# Patient Record
Sex: FEMALE | Race: WHITE
[De-identification: names, ages, dates, MRNs, and addresses within clinical notes are randomized per-mention and may not be internally consistent; named-entity substitution may affect disease eponyms.]

---

## 2018-09-21 ENCOUNTER — HOSPITAL ENCOUNTER (EMERGENCY)
Dept: HOSPITAL 76 - ED | Age: 78
Discharge: HOME | End: 2018-09-21
Payer: MEDICARE

## 2018-09-21 VITALS — SYSTOLIC BLOOD PRESSURE: 160 MMHG | DIASTOLIC BLOOD PRESSURE: 72 MMHG

## 2018-09-21 DIAGNOSIS — I10: ICD-10-CM

## 2018-09-21 DIAGNOSIS — R07.89: Primary | ICD-10-CM

## 2018-09-21 LAB
ALBUMIN DIAFP-MCNC: 4.5 G/DL (ref 3.2–5.5)
ALBUMIN/GLOB SERPL: 2 {RATIO} (ref 1–2.2)
ALP SERPL-CCNC: 36 IU/L (ref 42–121)
ALT SERPL W P-5'-P-CCNC: 16 IU/L (ref 10–60)
ANION GAP SERPL CALCULATED.4IONS-SCNC: 7 MMOL/L (ref 6–13)
AST SERPL W P-5'-P-CCNC: 23 IU/L (ref 10–42)
BASOPHILS NFR BLD AUTO: 0 10^3/UL (ref 0–0.1)
BASOPHILS NFR BLD AUTO: 0.5 %
BILIRUB BLD-MCNC: 0.8 MG/DL (ref 0.2–1)
BUN SERPL-MCNC: 17 MG/DL (ref 6–20)
CALCIUM UR-MCNC: 9.3 MG/DL (ref 8.5–10.3)
CHLORIDE SERPL-SCNC: 104 MMOL/L (ref 101–111)
CLARITY UR REFRACT.AUTO: CLEAR
CO2 SERPL-SCNC: 30 MMOL/L (ref 21–32)
CREAT SERPLBLD-SCNC: 0.4 MG/DL (ref 0.4–1)
EOSINOPHIL # BLD AUTO: 0.1 10^3/UL (ref 0–0.7)
EOSINOPHIL NFR BLD AUTO: 0.9 %
ERYTHROCYTE [DISTWIDTH] IN BLOOD BY AUTOMATED COUNT: 13.2 % (ref 12–15)
GFRSERPLBLD MDRD-ARVRAT: 154 ML/MIN/{1.73_M2} (ref 89–?)
GLOBULIN SER-MCNC: 2.3 G/DL (ref 2.1–4.2)
GLUCOSE SERPL-MCNC: 108 MG/DL (ref 70–100)
GLUCOSE UR QL STRIP.AUTO: NEGATIVE MG/DL
HGB UR QL STRIP: 14.1 G/DL (ref 12–16)
KETONES UR QL STRIP.AUTO: NEGATIVE MG/DL
LIPASE SERPL-CCNC: 37 U/L (ref 22–51)
LYMPHOCYTES # SPEC AUTO: 1.2 10^3/UL (ref 1.5–3.5)
LYMPHOCYTES NFR BLD AUTO: 16.8 %
MCH RBC QN AUTO: 33.8 PG (ref 27–31)
MCHC RBC AUTO-ENTMCNC: 34 G/DL (ref 32–36)
MCV RBC AUTO: 99.2 FL (ref 81–99)
MONOCYTES # BLD AUTO: 0.5 10^3/UL (ref 0–1)
MONOCYTES NFR BLD AUTO: 7 %
NEUTROPHILS # BLD AUTO: 5.1 10^3/UL (ref 1.5–6.6)
NEUTROPHILS # SNV AUTO: 6.9 X10^3/UL (ref 4.8–10.8)
NEUTROPHILS NFR BLD AUTO: 74.8 %
NITRITE UR QL STRIP.AUTO: NEGATIVE
PDW BLD AUTO: 7.6 FL (ref 7.9–10.8)
PH UR STRIP.AUTO: 6 PH (ref 5–7.5)
PLATELET # BLD: 261 10^3/UL (ref 130–450)
PROT SPEC-MCNC: 6.8 G/DL (ref 6.7–8.2)
PROT UR STRIP.AUTO-MCNC: NEGATIVE MG/DL
RBC # UR STRIP.AUTO: NEGATIVE /UL
RBC MAR: 4.19 10^6/UL (ref 4.2–5.4)
SODIUM SERPLBLD-SCNC: 141 MMOL/L (ref 135–145)
SP GR UR STRIP.AUTO: 1.02 (ref 1–1.03)
UROBILINOGEN UR QL STRIP.AUTO: (no result) E.U./DL
UROBILINOGEN UR STRIP.AUTO-MCNC: NEGATIVE MG/DL

## 2018-09-21 PROCEDURE — 71046 X-RAY EXAM CHEST 2 VIEWS: CPT

## 2018-09-21 PROCEDURE — 85025 COMPLETE CBC W/AUTO DIFF WBC: CPT

## 2018-09-21 PROCEDURE — 81003 URINALYSIS AUTO W/O SCOPE: CPT

## 2018-09-21 PROCEDURE — 84484 ASSAY OF TROPONIN QUANT: CPT

## 2018-09-21 PROCEDURE — 80053 COMPREHEN METABOLIC PANEL: CPT

## 2018-09-21 PROCEDURE — 83690 ASSAY OF LIPASE: CPT

## 2018-09-21 PROCEDURE — 36415 COLL VENOUS BLD VENIPUNCTURE: CPT

## 2018-09-21 PROCEDURE — 81001 URINALYSIS AUTO W/SCOPE: CPT

## 2018-09-21 PROCEDURE — 99283 EMERGENCY DEPT VISIT LOW MDM: CPT

## 2018-09-21 PROCEDURE — 93005 ELECTROCARDIOGRAM TRACING: CPT

## 2018-09-21 PROCEDURE — 85379 FIBRIN DEGRADATION QUANT: CPT

## 2018-09-21 PROCEDURE — 87086 URINE CULTURE/COLONY COUNT: CPT

## 2018-09-21 NOTE — XRAY REPORT
Reason:  R low chest pain

Procedure Date:  09/21/2018   

Accession Number:  [id number] / [id number]                    

Procedure:  XR  - Chest 2 View X-Ray CPT Code:  87371

 

FULL RESULT:

 

 

EXAM:

CHEST RADIOGRAPHY

 

EXAM DATE: 9/21/2018 12:40 PM.

 

CLINICAL HISTORY: R low chest pain.

 

COMPARISON: None.

 

TECHNIQUE: 2 views.

 

FINDINGS:

Lungs/Pleura: No definite localized infiltrate, consolidation, effusion, 

or pneumothorax.

 

Mediastinum: Heart and mediastinal contours are unremarkable. Upper lobe 

vessels not distended.

 

Other: Mild scoliosis with degenerative changes.

IMPRESSION: No acute disease.

 

RADIA

## 2018-09-21 NOTE — ED PHYSICIAN DOCUMENTATION
History of Present Illness





<Viviana Choe W - Last Filed: 09/21/18 09:50>





- History obtained from


History obtained from: Patient





- History of Present Illness


Timing: Today (She woke up in the middle night with a pain, it is right lateral 

low chest wall going through to the back.  It is worse if she twists.  She is 

not short of breath with it and it does not hurt to take a deep breath per se.  

It also feels better in a sitting position than it does laying flat.  There is 

no associated nausea.  She has not eaten much today but she did have a peanut 

butter sandwich and that did not make it worse.  She has traveled recently, to 

[facility].  She denies pedal edema or calf pain.)





<Vasile Moss M - Last Filed: 09/21/18 13:40>





- Stated complaint


Stated Complaint: RT UPPER SIDE/BACK PX





- Chief complaint


Chief Complaint: General





Review of Systems


Constitutional: denies: Fever, Chills, Fatigue


Cardiac: denies: Palpitations


Respiratory: denies: Dyspnea, Cough


GI: denies: Nausea, Vomiting





<Vasile Moss M - Last Filed: 09/21/18 13:40>





PD PAST MEDICAL HISTORY





- Past Medical History


Cardiovascular: Hypertension





- Past Surgical History


Past Surgical History: Yes


Ortho: Arthroscopic surgery


/GYN: Hysterectomy





- Social History


Does the pt smoke?: No


Smoking Status: Never smoker


Does the pt drink ETOH?: Yes


Does the pt have substance abuse?: No





- Immunizations


Immunizations are current?: Yes





<Viviana Choe W - Last Filed: 09/21/18 09:50>





<Vasile Moss M - Last Filed: 09/21/18 13:40>





- Present Medications


Home Medications: 


                                Ambulatory Orders











 Medication  Instructions  Recorded  Confirmed


 


RX: Estradiol 0.5 mg PO DAILY 02/07/16 02/07/16


 


RX: Lisinopril 10 mg PO DAILY 02/07/16 02/07/16


 


RX: amLODIPine [Norvasc] 5 mg PO DAILY 02/07/16 02/07/16


 


Cyclobenzaprine [Flexeril] 10 mg PO TID PRN #7 tablet 09/21/18 














- Allergies


Allergies/Adverse Reactions: 


                                    Allergies











Allergy/AdvReac Type Severity Reaction Status Date / Time


 


No Known Drug Allergies Allergy   Verified 02/07/16 09:44














PD ED PE NORMAL





- Vitals


Vital signs reviewed: Yes





- General


General: Alert and oriented X 3, No acute distress





- HEENT


HEENT: Pharynx benign





- Neck


Neck: Supple, no meningeal sign, No bony TTP





- Cardiac


Cardiac: RRR, No murmur





- Respiratory


Respiratory: No respiratory distress, Clear bilaterally





- Abdomen


Abdomen: Normal bowel sounds, Soft, Non tender





- Derm


Derm: No rash (Specifically looking for shingles rash, none is present at this 

juncture)





- Extremities


Extremities: No edema, No calf tenderness / cord





- Neuro


Neuro: Alert and oriented X 3, Normal speech





<IsaVasile ENOC - Last Filed: 09/21/18 13:40>





Results





- EKG (time done)


  ** 0932


Rate: Rate (enter#) (66)


Rhythm: NSR


QRS: Low voltage


Ischemia: Normal ST segments





<RodgeronViviana W - Last Filed: 09/21/18 09:50>





- Vitals


Vitals: 


                               Vital Signs - 24 hr











  09/21/18 09/21/18





  09:22 13:15


 


Temperature 36.5 C 


 


Heart Rate 69 70


 


Respiratory 14 14





Rate  


 


Blood Pressure 175/72 H 160/72 H


 


O2 Saturation 100 100








                                     Oxygen











O2 Source                      Room air

















- Labs


Labs: 


                                Laboratory Tests











  09/21/18 09/21/18 09/21/18





  11:37 11:37 11:37


 


WBC  6.9  


 


RBC  4.19 L  


 


Hgb  14.1  


 


Hct  41.5  


 


MCV  99.2 H  


 


MCH  33.8 H  


 


MCHC  34.0  


 


RDW  13.2  


 


Plt Count  261  


 


MPV  7.6 L  


 


Neut # (Auto)  5.1  


 


Lymph # (Auto)  1.2 L  


 


Mono # (Auto)  0.5  


 


Eos # (Auto)  0.1  


 


Baso # (Auto)  0.0  


 


Absolute Nucleated RBC  0.00  


 


Nucleated RBC %  0.0  


 


D-Dimer    < 200.0 L


 


Sodium   141 


 


Potassium   3.5 


 


Chloride   104 


 


Carbon Dioxide   30 


 


Anion Gap   7.0 


 


BUN   17 


 


Creatinine   0.4 


 


Estimated GFR (MDRD)   154 


 


Glucose   108 H 


 


Calcium   9.3 


 


Total Bilirubin   0.8 


 


AST   23 


 


ALT   16 


 


Alkaline Phosphatase   36 L 


 


Troponin I   


 


Total Protein   6.8 


 


Albumin   4.5 


 


Globulin   2.3 


 


Albumin/Globulin Ratio   2.0 


 


Lipase   37 


 


Urine Color   


 


Urine Clarity   


 


Urine pH   


 


Ur Specific Gravity   


 


Urine Protein   


 


Urine Glucose (UA)   


 


Urine Ketones   


 


Urine Occult Blood   


 


Urine Nitrite   


 


Urine Bilirubin   


 


Urine Urobilinogen   


 


Ur Leukocyte Esterase   


 


Ur Microscopic Review   


 


Urine Culture Comments   














  09/21/18 09/21/18





  11:37 11:48


 


WBC  


 


RBC  


 


Hgb  


 


Hct  


 


MCV  


 


MCH  


 


MCHC  


 


RDW  


 


Plt Count  


 


MPV  


 


Neut # (Auto)  


 


Lymph # (Auto)  


 


Mono # (Auto)  


 


Eos # (Auto)  


 


Baso # (Auto)  


 


Absolute Nucleated RBC  


 


Nucleated RBC %  


 


D-Dimer  


 


Sodium  


 


Potassium  


 


Chloride  


 


Carbon Dioxide  


 


Anion Gap  


 


BUN  


 


Creatinine  


 


Estimated GFR (MDRD)  


 


Glucose  


 


Calcium  


 


Total Bilirubin  


 


AST  


 


ALT  


 


Alkaline Phosphatase  


 


Troponin I  < 0.04 


 


Total Protein  


 


Albumin  


 


Globulin  


 


Albumin/Globulin Ratio  


 


Lipase  


 


Urine Color   YELLOW


 


Urine Clarity   CLEAR


 


Urine pH   6.0


 


Ur Specific Gravity   1.025


 


Urine Protein   NEGATIVE


 


Urine Glucose (UA)   NEGATIVE


 


Urine Ketones   NEGATIVE


 


Urine Occult Blood   NEGATIVE


 


Urine Nitrite   NEGATIVE


 


Urine Bilirubin   NEGATIVE


 


Urine Urobilinogen   0.2 (NORMAL)


 


Ur Leukocyte Esterase   NEGATIVE


 


Ur Microscopic Review   NOT INDICATED


 


Urine Culture Comments   NOT INDICATED














PD MEDICAL DECISION MAKING





<Viviana Choe W - Last Filed: 09/21/18 09:50>





<Vasile Moss - Last Filed: 09/21/18 13:40>





- ED course


ED course: 





The history and physical suggests a muscular origin of her pain.  Other 

etiologies are considered given her age, vascular abnormality unlikely given 

normal chest x-ray, negative d-dimer, and patient very comfortable with no need 

for painkillers in the department.  She is not tender over her gallbladder and 

pain did not get worse with eating.  Shingles is considered but no rash now but 

reminded to watch for this. PE is considered given recent travel but her d-dimer

 and a vital signs are without evidence of this. (Vasile Moss)





- Sepsis Event


Vital Signs: 


                               Vital Signs - 24 hr











  09/21/18 09/21/18





  09:22 13:15


 


Temperature 36.5 C 


 


Heart Rate 69 70


 


Respiratory 14 14





Rate  


 


Blood Pressure 175/72 H 160/72 H


 


O2 Saturation 100 100








                                     Oxygen











O2 Source                      Room air

















Departure





<Viviana Cheo - Last Filed: 09/21/18 09:50>





- Departure


Record reviewed to determine appropriate education?: Yes





<Vasile Moss - Last Filed: 09/21/18 13:40>





- Departure


Disposition: 01 Home, Self Care


Clinical Impression: 


 Right-sided chest pain





Condition: Good


Instructions:  ED Chest Pain NonCardiac


Prescriptions: 


Cyclobenzaprine [Flexeril] 10 mg PO TID PRN #7 tablet


 PRN Reason: Spasms


Comments: 


As discussed, this seems like muscular pain, but there are a few things you need

 to watch out for.  One is the rash that would be associated with shingles.  

Please return if you develop a rash anywhere in that area.  Second would be a 

gallbladder issue, it really does not seem like that but if you notice increased

 pain after eating, you will need reevaluation for that.





Call your doctor to arrange a follow-up appointment, make the next available 

appointment.  In the interim, return anytime if worse or if new symptoms 

develop.





Your blood pressure was elevated today on check into the emergency department.  

This does not mean that you have hypertension, it is a common phenomenon to come

 to the emergency department and have elevated blood pressure.  I recommend that

 you see your primary care physician within the week to have it rechecked when 

you are feeling better.


Discharge Date/Time: 09/21/18 13:15